# Patient Record
Sex: MALE | Race: WHITE | Employment: FULL TIME | URBAN - METROPOLITAN AREA
[De-identification: names, ages, dates, MRNs, and addresses within clinical notes are randomized per-mention and may not be internally consistent; named-entity substitution may affect disease eponyms.]

---

## 2017-01-19 ENCOUNTER — HOSPITAL ENCOUNTER (OUTPATIENT)
Dept: LAB | Age: 37
Discharge: HOME | End: 2017-01-19
Payer: COMMERCIAL

## 2017-01-19 LAB — TSH SERPL DL<=0.005 MIU/L-ACNC: 2.22 UIU/ML (ref 0.36–3.74)

## 2017-01-19 PROCEDURE — 84443 ASSAY THYROID STIM HORMONE: CPT | Performed by: HOSPITALIST

## 2017-01-19 PROCEDURE — 36415 COLL VENOUS BLD VENIPUNCTURE: CPT | Performed by: HOSPITALIST

## 2017-05-22 PROBLEM — E66.9 OBESITY (BMI 35.0-39.9 WITHOUT COMORBIDITY): Status: ACTIVE | Noted: 2017-05-22

## 2017-05-22 PROBLEM — M54.16 LUMBAR RADICULOPATHY: Status: ACTIVE | Noted: 2017-05-22

## 2022-03-19 PROBLEM — M54.16 LUMBAR RADICULOPATHY: Status: ACTIVE | Noted: 2017-05-22

## 2022-03-20 PROBLEM — E66.9 OBESITY (BMI 35.0-39.9 WITHOUT COMORBIDITY): Status: ACTIVE | Noted: 2017-05-22

## 2023-01-17 ENCOUNTER — HOSPITAL ENCOUNTER (EMERGENCY)
Age: 43
Discharge: HOME | End: 2023-01-17
Attending: EMERGENCY MEDICINE
Payer: COMMERCIAL

## 2023-01-17 ENCOUNTER — APPOINTMENT (OUTPATIENT)
Dept: GENERAL RADIOLOGY | Age: 43
End: 2023-01-17
Payer: COMMERCIAL

## 2023-01-17 VITALS
WEIGHT: 315 LBS | SYSTOLIC BLOOD PRESSURE: 147 MMHG | HEIGHT: 63 IN | RESPIRATION RATE: 20 BRPM | BODY MASS INDEX: 55.81 KG/M2 | DIASTOLIC BLOOD PRESSURE: 99 MMHG | OXYGEN SATURATION: 100 % | HEART RATE: 78 BPM | TEMPERATURE: 98 F

## 2023-01-17 DIAGNOSIS — S93.601A SPRAIN OF RIGHT FOOT, INITIAL ENCOUNTER: Primary | ICD-10-CM

## 2023-01-17 PROCEDURE — 73630 X-RAY EXAM OF FOOT: CPT

## 2023-01-17 PROCEDURE — 96372 THER/PROPH/DIAG INJ SC/IM: CPT

## 2023-01-17 PROCEDURE — 6360000002 HC RX W HCPCS: Performed by: PHYSICIAN ASSISTANT

## 2023-01-17 PROCEDURE — 99284 EMERGENCY DEPT VISIT MOD MDM: CPT

## 2023-01-17 PROCEDURE — 73610 X-RAY EXAM OF ANKLE: CPT

## 2023-01-17 RX ORDER — IBUPROFEN 800 MG/1
800 TABLET ORAL
Qty: 21 TABLET | Refills: 0 | Status: SHIPPED | OUTPATIENT
Start: 2023-01-17 | End: 2023-01-24

## 2023-01-17 RX ORDER — KETOROLAC TROMETHAMINE 30 MG/ML
30 INJECTION, SOLUTION INTRAMUSCULAR; INTRAVENOUS
Status: COMPLETED | OUTPATIENT
Start: 2023-01-17 | End: 2023-01-17

## 2023-01-17 RX ADMIN — KETOROLAC TROMETHAMINE 30 MG: 30 INJECTION, SOLUTION INTRAMUSCULAR at 08:46

## 2023-01-17 ASSESSMENT — PAIN - FUNCTIONAL ASSESSMENT: PAIN_FUNCTIONAL_ASSESSMENT: 0-10

## 2023-01-17 ASSESSMENT — PAIN SCALES - GENERAL
PAINLEVEL_OUTOF10: 5
PAINLEVEL_OUTOF10: 6

## 2023-01-17 ASSESSMENT — PAIN DESCRIPTION - FREQUENCY: FREQUENCY: CONTINUOUS

## 2023-01-17 ASSESSMENT — PAIN DESCRIPTION - PAIN TYPE: TYPE: ACUTE PAIN

## 2023-01-17 ASSESSMENT — ENCOUNTER SYMPTOMS: COLOR CHANGE: 0

## 2023-01-17 ASSESSMENT — PAIN DESCRIPTION - ORIENTATION
ORIENTATION: RIGHT
ORIENTATION: RIGHT

## 2023-01-17 ASSESSMENT — PAIN DESCRIPTION - LOCATION
LOCATION: ANKLE;FOOT
LOCATION: ANKLE

## 2023-01-17 ASSESSMENT — PAIN DESCRIPTION - DESCRIPTORS: DESCRIPTORS: THROBBING

## 2023-01-17 NOTE — DISCHARGE INSTRUCTIONS
As discussed your XR were reassuring today, there is no sign of fracture. Ice and elevate the foot to help with pain and swelling. Alternate tylenol and motrin as needed for pain. You can take tylenol every 4 hours as needed. You can take ibuprofen every 6 hours as needed. Follow-up with your PCP in 1 to 2 days if no improvement. Return to the ER for any new or worsening symptoms.

## 2023-01-17 NOTE — ED NOTES
I have reviewed discharge instructions with the patient and spouse. The patient and spouse verbalized understanding. Patient left ED via Discharge Method: ambulatory to Home with (family/friend). Opportunity for questions and clarification provided. Patient given 1 scripts. No esign       To continue your aftercare when you leave the hospital, you may receive an automated call from our care team to check in on how you are doing. This is a free service and part of our promise to provide the best care and service to meet your aftercare needs.  If you have questions, or wish to unsubscribe from this service please call [phone]. Thank you for Choosing our [facility] Emergency Department.       Prema Street RN  01/17/23 1372

## 2023-01-17 NOTE — ED PROVIDER NOTES
Emergency Department Provider Note                   PCP:                Shelly Dean MD               Age: 36 y.o. Sex: male       ICD-10-CM    1. Sprain of right foot, initial encounter  S[address] [place] [address], [address] [place] [address] Decision To Discharge 01/17/2023 09:04:49 AM        Medical Decision Making  Ddx: fracture, sprain, septic arthritis, arthritis, dislocation, DJD, cellulitis, gout    Overall patient is a well-appearing 43-year-old male who presented to the emergency department today for right foot and ankle injury that occurred about 1 hour prior to arrival.  Pain localized over the dorsum of the right foot and lateral malleolus. He is neurovascularly intact. He has full range of motion of the right ankle joint. No appreciable laxity, and no proximal tib-fib or knee tenderness. X-rays today negative for any acute fracture, suspect sprain. Ace wrap applied, patient given crutches to use as needed for pain with ambulation. Patient instructed to ice and elevate at home will place referral to orthopedics for follow-up if patient does not have any improvement in his symptoms over the next week. He will return for any new or worsening symptoms. Amount and/or Complexity of Data Reviewed  Radiology: independent interpretation performed. Details: I have reviewed XR images and agree with radiologist interpretation. Risk  Prescription drug management. Complexity of Problem: 1 acute, uncomplicated illness or injury. (3)  I have conducted an independent ordering and review of X-rays. Considerations: Shared decision making was utilized in the care of this patient. Patient discharged home with orthopedic referral    ED Course as of 01/17/23 1543   Tue Jan 17, 2023   0821 FINDINGS:   Right ankle: No evidence of fracture or malalignment. Ankle mortise is  symmetric. Talar dome is intact.  Mild tibiotalar degenerative changes with  marginal osteophytes. Normal bone mineralization. There is lateral predominant  soft tissue swelling about the ankle which extends along the dorsal foot. Right foot: No evidence of fracture. There is hallux valgus alignment with  moderate first MTP and sesamoid-phalangeal osteoarthrosis. Joint spaces are  otherwise preserved. Normal bone mineralization. There is soft tissue swelling  which is predominantly along the dorsum of the foot. IMPRESSION:  Right ankle and foot:  1. Soft tissue swelling, along the lateral ankle and dorsal foot. No acute  osseous abnormality. [KE]      ED Course User Index  [KE] CEE Archuleta        Orders Placed This Encounter   Procedures    XR ANKLE RIGHT (MIN 3 VIEWS)    XR FOOT RIGHT (MIN 3 VIEWS)    [facility] - [employer], [employer]    Apply ace wrap    Air Cast    [employer] Crutches; Pair, Right Side Injury; Med (5'2\"-5'10\")        Medications   ketorolac (TORADOL) injection 30 mg (30 mg IntraMUSCular Given 1/17/23 0846)       Discharge Medication List as of 1/17/2023 11:39 AM        START taking these medications    Details   ibuprofen (ADVIL;MOTRIN) 800 MG tablet Take 1 tablet by mouth 3 times daily (with meals) for 7 days, Disp-21 tablet, R-0Print              Fara Cartagena is a 36 y.o. male who presents to the Emergency Department with chief complaint of    Chief Complaint   Patient presents with    Foot Injury    Ankle Pain      41-year-old male presents to the emergency department today for evaluation of right ankle and foot injury. Patient states that he was at the gym this morning performing jujitsu exercises when he rolled his right ankle. He states that he has not put any weight on the foot since that time due to fear of pain with weightbearing. He reports pain and swelling located mostly over the dorsum of the foot as well as the lateral malleolus. Denies any numbness or tingling in the foot.   No decreased range of motion to the ankle joint. Denies any pain in the proximal lower leg or knee. The history is provided by the patient. No  was used. Review of Systems   Constitutional:  Negative for activity change and fever. Musculoskeletal:         Right foot and ankle injury   Skin:  Negative for color change. Neurological:  Negative for numbness. Hematological:  Does not bruise/bleed easily. No past medical history on file. No past surgical history on file. No family history on file. Social History     Socioeconomic History    Marital status:          Patient has no known allergies. Discharge Medication List as of 1/17/2023 11:39 AM           Vitals signs and nursing note reviewed. No data found. Physical Exam  Vitals and nursing note reviewed. Constitutional:       General: He is not in acute distress. Appearance: Normal appearance. HENT:      Head: Normocephalic and atraumatic. Nose: Nose normal.   Eyes:      Extraocular Movements: Extraocular movements intact. Conjunctiva/sclera: Conjunctivae normal.      Pupils: Pupils are equal, round, and reactive to light. Cardiovascular:      Rate and Rhythm: Normal rate and regular rhythm. Pulmonary:      Effort: Pulmonary effort is normal.   Musculoskeletal:      Cervical back: Normal range of motion. Skin:     General: Skin is warm and dry. Capillary Refill: Capillary refill takes less than 2 seconds. Neurological:      General: No focal deficit present. Mental Status: He is alert and oriented to person, place, and time. Sensory: No sensory deficit. Motor: No weakness. Gait: Gait normal.   Psychiatric:         Mood and Affect: Mood normal.         Thought Content:  Thought content normal.         Judgment: Judgment normal.        Procedures    Results for orders placed or performed during the hospital encounter of 01/17/23   XR ANKLE RIGHT (MIN 3 VIEWS)    Narrative    Examination: XR ANKLE RIGHT (MIN 3 VIEWS), XR FOOT RIGHT (MIN 3 VIEWS)    INDICATION: Right foot and ankle pain status post injury    COMPARISON: None    FINDINGS:   Right ankle: No evidence of fracture or malalignment. Ankle mortise is  symmetric. Talar dome is intact. Mild tibiotalar degenerative changes with  marginal osteophytes. Normal bone mineralization. There is lateral predominant  soft tissue swelling about the ankle which extends along the dorsal foot. Right foot: No evidence of fracture. There is hallux valgus alignment with  moderate first MTP and sesamoid-phalangeal osteoarthrosis. Joint spaces are  otherwise preserved. Normal bone mineralization. There is soft tissue swelling  which is predominantly along the dorsum of the foot. Impression    Right ankle and foot:  1. Soft tissue swelling, along the lateral ankle and dorsal foot. No acute  osseous abnormality. XR FOOT RIGHT (MIN 3 VIEWS)    Narrative    Examination: XR ANKLE RIGHT (MIN 3 VIEWS), XR FOOT RIGHT (MIN 3 VIEWS)    INDICATION: Right foot and ankle pain status post injury    COMPARISON: None    FINDINGS:   Right ankle: No evidence of fracture or malalignment. Ankle mortise is  symmetric. Talar dome is intact. Mild tibiotalar degenerative changes with  marginal osteophytes. Normal bone mineralization. There is lateral predominant  soft tissue swelling about the ankle which extends along the dorsal foot. Right foot: No evidence of fracture. There is hallux valgus alignment with  moderate first MTP and sesamoid-phalangeal osteoarthrosis. Joint spaces are  otherwise preserved. Normal bone mineralization. There is soft tissue swelling  which is predominantly along the dorsum of the foot. Impression    Right ankle and foot:  1. Soft tissue swelling, along the lateral ankle and dorsal foot. No acute  osseous abnormality. XR ANKLE RIGHT (MIN 3 VIEWS)   Final Result   Right ankle and foot:   1. Soft tissue swelling, along the lateral ankle and dorsal foot. No acute   osseous abnormality. XR FOOT RIGHT (MIN 3 VIEWS)   Final Result   Right ankle and foot:   1. Soft tissue swelling, along the lateral ankle and dorsal foot. No acute   osseous abnormality. Voice dictation software was used during the making of this note. This software is not perfect and grammatical and other typographical errors may be present. This note has not been completely proofread for errors.        [facility]  01/17/23 6723

## 2023-01-17 NOTE — ED TRIAGE NOTES
Pt was practicing Diane in the gym and rolled his ankle. Top part of right foot is very painful and red. Swelling noted to right ankle and foot. Injury happened 1 hour.  ago

## 2023-01-26 ENCOUNTER — OFFICE VISIT (OUTPATIENT)
Dept: ORTHOPEDIC SURGERY | Age: 43
End: 2023-01-26

## 2023-01-26 VITALS — WEIGHT: 315 LBS | HEIGHT: 63 IN | BODY MASS INDEX: 55.81 KG/M2

## 2023-01-26 DIAGNOSIS — M20.5X1 ACQUIRED CLAW TOE OF RIGHT FOOT: ICD-10-CM

## 2023-01-26 DIAGNOSIS — S99.911A INJURY OF RIGHT ANKLE, INITIAL ENCOUNTER: ICD-10-CM

## 2023-01-26 DIAGNOSIS — S99.921A INJURY OF RIGHT FOOT, INITIAL ENCOUNTER: ICD-10-CM

## 2023-01-26 DIAGNOSIS — M21.619 BUNION: ICD-10-CM

## 2023-01-26 DIAGNOSIS — M79.671 RIGHT FOOT PAIN: Primary | ICD-10-CM

## 2023-01-26 NOTE — PROGRESS NOTES
Patient was fitted and instructed on a Reparel Ankle Sleeve and Carbon Fiber Insert for the right foot. The patient was prescribed a walker boot for the patient's right foot. The patient wears a size 13 shoe and I fitted them with a L size boot. The patient was fitted and instructed on the use of prescribed walker boot. I explained how to fit themselves and that the plastic flexible piece should always be on the front of the boot and secured by the Velcro straps on top. The air bladder in the boot was adjusted according to proper fit and comfort. The patient walked a short distance and acknowledged satisfaction with current fit. I also explained that they need a heel lift or a higher heeled shoe for the uninvolved LE to help normalize gait and avoid excessive low back stress/strain due to leg length inequality created from walker boot. Patient read and signed documenting they understand and agree to [facility] current DME return policy.

## 2023-01-26 NOTE — PROGRESS NOTES
Name: Bud Hou  YOB: 1980  Gender: male  MRN: [id number]     CC: Right ankle injury: Right foot injury    HPI:   01/17/2023: He was at the gym practicing gricel fell and another person fell on top of him.  [facility]  01/26/2023: Initial visit for: Right ankle injury: Right foot injury    ROS/Meds/PSH/PMH/FH/SH: reviewed today    Tobacco:  reports that he has never smoked. He has never used smokeless tobacco.     Physical Examination:  Patient appears to be alert and oriented with acceptable appearance. No obvious distress or SOB  CV: appears to have acceptable vascular color and capillary refill  Neuro: appears to have mostly intact light touch sensation   Skin: Right ankle to midfoot swelling with bruising of the ankle extending into the toes  MS: Standing: Plantigrade: Right bunion and second claw toe: Gait appears full in regular shoes  Right = mild medial ankle/Deltoid pain  Right = low ATFL/CFL lateral ankle pain; no syndesmotic pain; no interosseous or proximal tib-fib pain  Right = Chopart dorsal hindfoot area pain; no TMT pain; no metatarsal pain  Right = no pain with syndesmotic or ankle instability testing  Right = good ankle/hindfoot motion; 5/5 strength    XR: Right side: Standing AP lateral mortise ankle plus AP oblique foot taken today with bunion; claw toe; os peroneum; soft tissue swelling but no definite fracture; suspected Chopart injury;  XR Impression:  As above      Reviewed Test/Records/Documents:   01/17/2023: 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 Top of right foot was red and painful    01/17/2023: Right ankle and foot x-rays: Radiologic Impression: Soft tissue swelling about the lateral ankle and dorsal foot. No acute osseous abnormality.   My read is possible first metatarsal base fracture     Injection: None applicable    Assessment:    Right ankle injury: Mild Deltoid sprain: ATFL/CFL sprains  Right foot injury: Suspected Chopart injury/sprain    Plan:   The patient and I discussed the above assessment. We explored treatment options. XR in the hospital and again today reveal changes of the first metatarsal but he has no pain in that region  His wife had questions about bunion and claw toe treatment, but at this time, he is not interested in forefoot reconstruction  Regarding his injury, the description of his injury is suspicious for Chopart to Lisfranc injuries, bu  on exam he has no TMT pain; pain seems to be more in the dorsal navicular region  Plan is to give this a few weeks and reevaluate since he is already FWB in regular shoes   Advanced medical imaging: Right foot MRI scan: Right foot CT scan: Possibilities    DME: 3D boot: Reparel sleeve: Carbon fiber  We discussed foot care and boot protection  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 He is not interested in surgery at this point. I outlined the surgical procedure. He understands Magne cream mobilities. Follow up: 2 weeks: X-rays foot and ankle  Work status: He prefers regular work    This note was created using Dragon voice recognition software which may result in errors of speech and spelling recognition and word/phrase syntax errors.

## 2023-01-26 NOTE — LETTER
DME Patient Authorization Form    Name: Celina Razo  : 1980  MRN: [id number]   Age: 36 y.o. Gender: male  Delivery Address: [facility]     Diagnosis:     ICD-10-CM    1. Right foot pain  M79.671 XR ANKLE RIGHT (MIN 3 VIEWS)     XR FOOT RIGHT (2 VIEWS)     Carbon Fiber Insert ()     Reparel Ankle Sleeve ()     Frantz Rodes ()      2. Injury of right ankle, initial encounter  S99.911A Carbon Fiber Insert ()     Reparel Ankle Sleeve ()     Frantz Rodes ()      3. Injury of right foot, initial encounter  A20.701V Carbon Fiber Insert ()     Reparel Ankle Sleeve ()     Frantz Rodes ()      4. Bunion  M21.619 Carbon Fiber Insert ()     Reparel Ankle Sleeve ()     Frantz Rodes ()      5. Acquired claw toe of right foot  M20.5X1 Carbon Fiber Insert ()     Reparel Ankle Sleeve ()     Frantz Rodes ()           Requested DME:  Carbon Fiber Insert -  ($30) X 1 - right  Reparel Ankle Sleeve**AANKL ($30) X 1 - right  Walker Boot -  ($290.00) X 1 - right        Clinical Notes:     **Indicates non-covered items by insurance. Payment expected on date of service. Electronically signed by  Provider: Josh Velez MD__Date: 2023                            [facility]/[address] Tax ID # [id number]        Durable Medical Equipment and/or Orthotics Patient Consent     I understand that my physician has prescribed this medical supply as part of my treatment plan as a matter of Medical Necessity.  I understand that I have a choice in where I receive my prescribed orthopedic supplies and/or services.  I authorize [facility] to furnish this service/product and to provide my insurance carrier with any information requested in order to process for payment.    I instruct my insurance carrier to pay [facility] directly for these services/products.  I understand that my insurance carrier may deny payment for this supply because it is a non-covered item, deemed not medically necessary or considered experimental.   I understand that any cost not covered by my insurance carrier will be solely my financial responsibility.  I have received the Supplier Standards and have reviewed them.  I have received the prescribed item and have been fully instructed on the proper use of the above services/products.    ______ (Patient Initials) I understand that all DME items are non-returnable after being dispensed. Items still in sealed packaging may be returned up to 14 days after purchasing. [address] will replace items that are defective.    ______ (Patient Initials) I understand that [facility] will not file a claim with my insurance carrier for this service/product and I am waiving my right to file a claim on my own for this service/product with my insurance company as this item is NON-COVERED (Denoted by the **) by my Insurance company/policy. ______ (Patient Initials) I understand that I am responsible to bring my equipment to the hospital for any surgery. ______________________________________________  ________________________  Patient / Pillo Freedman            Thank you for considering [address] Your physician has prescribed specific medical equipment or devices for your home use. The following describes your rights and responsibilities as our customer. Right to Choose Providers: You have a choice regarding which company supplies your home medical equipment and devices, and to consult your physician in this decision. You may choose a medical supply store, a home medical equipment provider, or a specialist such as [facility].   [facility] will coordinate with your physician to provide the medical equipment or devices prescribed for your home use.    Right to Service:  You have the right to considerate, respectful and nondiscriminatory care.  You have the right to receive accurate and easily understood information about your health care.  If you speak a foreign language, or don't understand the discussions, assistance will be provided to allow you to make informed health care decisions.  You have the right to know your treatment options and to participate in decisions about your care, including the right to accept or refuse treatment.  You have the right to expect a reasonable response to your requests for treatment or service.  You have the right to talk in confidence with health care providers and to have your health care information protected.  You have the right to receive an explanation of your bill.  You have the right to complain about the service or product you receive.    Patient Responsibilities:  Please provide complete and accurate information about your health insurance benefits and make arrangements for the timely payment of your bill.  [facility] will, if possible, assume responsibility for billing your insurance (Medicare, Medicaid and commercial) for the prescribed equipment or devices.   If your policy does not cover the prescribed product, or only covers a portion of the bill, you are responsible for any remaining balance.      Return and Exchange Policy:  [facility] will honor published  Warranties for products.  [facility] will accept returns or exchanges within 14 days from the date of receipt, providin) the product must be in new condition; 2) receipt as required; and 3) used disposable and hygiene products may only be returned due to a defective product. Note: Refunds will be issued in a timely manner, please allow 4-6 weeks for processing.   Complaint Procedures and DME Consumer Protection Resources:  [facility] values you as a customer, and is  committed to resolving patient concerns. This commitment includes understanding and documenting your concerns, conducting a review of internal procedures, and providing you with an explanation and resolution to your concerns. Should you have any questions about our services or billing process, please contact our office at (practice phone number). If we are unable to resolve the concern, you have the right to direct comments to the office of Consumer Protection, in the [address] [place] [address]. S. or the [facility] office, without fear of repercussion. DMEPOS SUPPLIER STANDARDS    A supplier must be in compliance with all applicable Federal and [employer] and regulatory requirements. A supplier must provide complete and accurate information on the DMEPOS supplier application. Any changes to this information must be reported to the [facility] within 30 days. An authorized individual (one whose signature is binding) must sign the application for billing privileges. A supplier must fill orders from its own inventory, or must contract with other companies for the purchase of items necessary to fill the order. A supplier may not contract with any entity that is currently excluded from the Medicare program, any [facility] program, or from any other Federal procurement or Nonprocurement programs. A supplier must advise beneficiaries that they may rent or purchase inexpensive or routinely purchased durable medical equipment, and of the purchase option for capped rental equipment. A supplier must notify beneficiaries of warranty coverage and honor all warranties under applicable State Law, and repair or replace free of charge Medicare covered items that are under warranty. A supplier must maintain a physical facility on an appropriate site.   A supplier must permit CMS, or its agents to conduct on-site inspections to ascertain the supplier's compliance with these standards. The supplier location must be accessible to beneficiaries during reasonable business hours, and must maintain a visible sign and posted hours of operation. A supplier must maintain a primary business telephone listed under the name of the business in a [employer] or a toll free number available through directory assistance. The exclusive use of a beeper, answering machine or cell phone is prohibited. A supplier must have comprehensive liability insurance in the amount of at least $300,000 that covers both the supplier's place of business and all customers and employees of the supplier. If the supplier manufactures its own items, this insurance must also cover product liability and completed operations. A supplier must agree not to initiate telephone contact with beneficiaries, with a few exceptions allowed. This standard prohibits suppliers from calling beneficiaries in order to solicit new business. A supplier is responsible for delivery and must instruct beneficiaries on use of Medicare covered items, and maintain proof of delivery. A supplier must answer questions, and respond to complaints of the beneficiaries, and maintain documentation of such contacts. A supplier must maintain and replace at no charge or repair directly, or through a service contract with another company, Medicare covered items it has rented to beneficiaries. A supplier must accept returns of substandard (less than full quality for the particular item) or unsuitable items (inappropriate for the beneficiary at the time it was fitted and rented or sold) from beneficiaries. A supplier must disclose these supplier standards to each beneficiary to whom it supplies a Medicare-covered item. A supplier must disclose to the government any person having ownership, financial, or control interest in the supplier.   A supplier must not convey or reassign a supplier number; i.e., the supplier may not sell or allow another entity to use its Medicare billing number. A supplier must have a complaint resolution protocol established to address beneficiary complaints that relate to these standards. A record of these complaints must be maintained at the physical facility. Complaint records must include: the name, address, telephone number and health insurance claim number of the beneficiary, a summary of the complaint, and any action taken to resolve it. A supplier must agree to furnish CMS any information required by the Medicare statute and implementing regulations. A supplier of DMEPOS and other items and services must be accredited by a CMS-approved accreditation organization in order to receive and retain a supplier billing number. The accreditation must indicate the specific products and services, for which the supplier is accredited in order for the supplier to receive payment for those specific products and services. A DMEPOS supplier must notify their accreditation organization when a new DMEPOS location is opened. The accreditation organization may accredit the new supplier location for three months after it is operational without requiring a new site visit. All DMEPOS supplier locations, whether owned or subcontracted, must meet the [employer] and be separately accredited in order to bill Medicare. An accredited supplier may be denied enrollment or their enrollment may be revoked, if CMS determines that they are not in compliance with the DMEPOS quality standards. A DMEPOS supplier must disclose upon enrollment all products and services, including the addition of new product lines for which they are seeking accreditation. If a new product line is added after enrollment, the DMEPOS supplier will be responsible for notifying the accrediting body of the new product so that the DMEPOS supplier can be re-surveyed and accredited for these new products. Must meet the surety bond requirements specified in 42 C. F.R. 424. 57(c). Implementation date- May 4, 2009. A supplier must obtain oxygen from a state-licensed oxygen supplier. A supplier must maintain ordering and referring documentation consistent with provisions found in 42 C. F.R. 424.516(f). DMEPOS suppliers are prohibited from sharing a practice location with certain other Medicare providers and suppliers. DMEPOS suppliers must remain open to the public for a minimum of 30 hours per week with certain exceptions.